# Patient Record
Sex: MALE | Race: WHITE | NOT HISPANIC OR LATINO
[De-identification: names, ages, dates, MRNs, and addresses within clinical notes are randomized per-mention and may not be internally consistent; named-entity substitution may affect disease eponyms.]

---

## 2017-02-04 ENCOUNTER — TRANSCRIPTION ENCOUNTER (OUTPATIENT)
Age: 47
End: 2017-02-04

## 2018-07-26 ENCOUNTER — EMERGENCY (EMERGENCY)
Facility: HOSPITAL | Age: 48
LOS: 1 days | End: 2018-07-26
Payer: COMMERCIAL

## 2018-07-26 PROCEDURE — 99284 EMERGENCY DEPT VISIT MOD MDM: CPT

## 2021-09-13 ENCOUNTER — TRANSCRIPTION ENCOUNTER (OUTPATIENT)
Age: 51
End: 2021-09-13

## 2021-09-13 ENCOUNTER — EMERGENCY (EMERGENCY)
Facility: HOSPITAL | Age: 51
LOS: 0 days | Discharge: HOME | End: 2021-09-14
Attending: EMERGENCY MEDICINE
Payer: COMMERCIAL

## 2021-09-13 VITALS
DIASTOLIC BLOOD PRESSURE: 45 MMHG | SYSTOLIC BLOOD PRESSURE: 69 MMHG | RESPIRATION RATE: 24 BRPM | HEIGHT: 74 IN | HEART RATE: 88 BPM | WEIGHT: 229.94 LBS | TEMPERATURE: 100 F | OXYGEN SATURATION: 96 %

## 2021-09-13 DIAGNOSIS — Z88.0 ALLERGY STATUS TO PENICILLIN: ICD-10-CM

## 2021-09-13 DIAGNOSIS — R53.83 OTHER FATIGUE: ICD-10-CM

## 2021-09-13 DIAGNOSIS — R53.81 OTHER MALAISE: ICD-10-CM

## 2021-09-13 DIAGNOSIS — U07.1 COVID-19: ICD-10-CM

## 2021-09-13 DIAGNOSIS — E78.5 HYPERLIPIDEMIA, UNSPECIFIED: ICD-10-CM

## 2021-09-13 DIAGNOSIS — R51.9 HEADACHE, UNSPECIFIED: ICD-10-CM

## 2021-09-13 DIAGNOSIS — R06.02 SHORTNESS OF BREATH: ICD-10-CM

## 2021-09-13 PROCEDURE — 99284 EMERGENCY DEPT VISIT MOD MDM: CPT

## 2021-09-13 PROCEDURE — 99284 EMERGENCY DEPT VISIT MOD MDM: CPT | Mod: 25

## 2021-09-13 PROCEDURE — 93005 ELECTROCARDIOGRAM TRACING: CPT

## 2021-09-13 PROCEDURE — 96372 THER/PROPH/DIAG INJ SC/IM: CPT

## 2021-09-13 PROCEDURE — 93010 ELECTROCARDIOGRAM REPORT: CPT

## 2021-09-13 RX ORDER — ACETAMINOPHEN 500 MG
1000 TABLET ORAL ONCE
Refills: 0 | Status: COMPLETED | OUTPATIENT
Start: 2021-09-13 | End: 2021-09-13

## 2021-09-13 RX ORDER — ONDANSETRON 8 MG/1
8 TABLET, FILM COATED ORAL ONCE
Refills: 0 | Status: COMPLETED | OUTPATIENT
Start: 2021-09-13 | End: 2021-09-13

## 2021-09-13 RX ORDER — KETOROLAC TROMETHAMINE 30 MG/ML
60 SYRINGE (ML) INJECTION ONCE
Refills: 0 | Status: DISCONTINUED | OUTPATIENT
Start: 2021-09-13 | End: 2021-09-13

## 2021-09-13 RX ADMIN — Medication 1000 MILLIGRAM(S): at 23:53

## 2021-09-13 NOTE — ED PROVIDER NOTE - CARE PLAN
Principal Discharge DX:	2019 novel coronavirus disease (COVID-19)  Secondary Diagnosis:	Malaise and fatigue  Secondary Diagnosis:	Headache   1

## 2021-09-13 NOTE — ED ADULT TRIAGE NOTE - CHIEF COMPLAINT QUOTE
c/o headache, difficulty breathing, loss of taste/smell, chills and sweats, c/o eye soreness, c/o feeling "faint", symptoms started 3 days ago HX: high cholesterol c/o headache, difficulty breathing, loss of taste/smell, chills and sweats, c/o eye soreness, c/o feeling "faint", symptoms started 3 days ago HX: high cholesterol, patient's spouse Margaritamckenna Guydavid #[phone]

## 2021-09-13 NOTE — ED PROVIDER NOTE - MUSCULOSKELETAL, MLM
Spine appears normal, range of motion is not limited, no muscle or joint tenderness normal steady gait

## 2021-09-13 NOTE — ED PROVIDER NOTE - PATIENT PORTAL LINK FT
You can access the FollowMyHealth Patient Portal offered by [facility] by registering at the following website: [email or web address]. By joining [facility]’s FollowMyHealth portal, you will also be able to view your health information using other applications (apps) compatible with our system.

## 2021-09-13 NOTE — ED PROVIDER NOTE - NSFOLLOWUPINSTRUCTIONS_ED_ALL_ED_FT
Plenty of rest  Plenty of fluids  Ibuprofen and tylenol as needed for achiness, headache and/or fever  Someone will call you to schedule monoclonal antibody treatment.  Anything worsens or persists, return to ER for further care and evaluation.

## 2021-09-13 NOTE — ED PROVIDER NOTE - CLINICAL SUMMARY MEDICAL DECISION MAKING FREE TEXT BOX
will treat w/  Toradol, Zofran and sign up for monoclonal antibodies. Pt stable for d/c. will treat w/  Toradol, Zofran and sign up for monoclonal antibodies. VSS and stable for dc at this time. Supportive measures and return precautions discussed.

## 2021-09-14 VITALS
SYSTOLIC BLOOD PRESSURE: 131 MMHG | RESPIRATION RATE: 20 BRPM | HEART RATE: 96 BPM | OXYGEN SATURATION: 97 % | TEMPERATURE: 99 F | DIASTOLIC BLOOD PRESSURE: 69 MMHG

## 2021-09-14 RX ADMIN — ONDANSETRON 8 MILLIGRAM(S): 8 TABLET, FILM COATED ORAL at 00:15

## 2021-09-14 RX ADMIN — Medication 60 MILLIGRAM(S): at 00:14

## 2021-09-14 NOTE — ED ADULT NURSE NOTE - OBJECTIVE STATEMENT
c/o headache, difficulty breathing, loss of taste/smell, chills and sweats, c/o eye soreness, c/o feeling "faint", symptoms started 3 days ago HX: high cholesterol, patient's spouse Margaritamckenna Guydavid #[phone]

## 2021-09-14 NOTE — ED ADULT NURSE NOTE - CHIEF COMPLAINT QUOTE
c/o headache, difficulty breathing, loss of taste/smell, chills and sweats, c/o eye soreness, c/o feeling "faint", symptoms started 3 days ago HX: high cholesterol, patient's spouse Margaritamckenna Guydavid #[phone]

## 2021-09-15 ENCOUNTER — OUTPATIENT (OUTPATIENT)
Dept: OUTPATIENT SERVICES | Facility: HOSPITAL | Age: 51
LOS: 1 days | End: 2021-09-15
Payer: COMMERCIAL

## 2021-09-15 ENCOUNTER — APPOINTMENT (OUTPATIENT)
Dept: DISASTER EMERGENCY | Facility: HOSPITAL | Age: 51
End: 2021-09-15

## 2021-09-15 VITALS
HEART RATE: 97 BPM | RESPIRATION RATE: 18 BRPM | TEMPERATURE: 102 F | DIASTOLIC BLOOD PRESSURE: 77 MMHG | SYSTOLIC BLOOD PRESSURE: 126 MMHG | OXYGEN SATURATION: 94 %

## 2021-09-15 VITALS
WEIGHT: 227.96 LBS | OXYGEN SATURATION: 94 % | DIASTOLIC BLOOD PRESSURE: 79 MMHG | TEMPERATURE: 101 F | RESPIRATION RATE: 18 BRPM | HEIGHT: 78 IN | HEART RATE: 102 BPM | SYSTOLIC BLOOD PRESSURE: 127 MMHG

## 2021-09-15 DIAGNOSIS — U07.1 COVID-19: ICD-10-CM

## 2021-09-15 PROCEDURE — M0243: CPT

## 2021-09-15 RX ORDER — ACETAMINOPHEN 500 MG
650 TABLET ORAL ONCE
Refills: 0 | Status: COMPLETED | OUTPATIENT
Start: 2021-09-15 | End: 2021-09-15

## 2021-09-15 RX ORDER — SODIUM CHLORIDE 9 MG/ML
250 INJECTION INTRAMUSCULAR; INTRAVENOUS; SUBCUTANEOUS
Refills: 0 | Status: COMPLETED | OUTPATIENT
Start: 2021-09-15 | End: 2021-09-15

## 2021-09-15 RX ADMIN — SODIUM CHLORIDE 310 MILLILITER(S): 9 INJECTION INTRAMUSCULAR; INTRAVENOUS; SUBCUTANEOUS at 14:19

## 2021-09-15 RX ADMIN — Medication 650 MILLIGRAM(S): at 15:18

## 2021-09-15 NOTE — CHART NOTE - NSCHARTNOTEFT_GEN_A_CORE
CC: Monoclonal Antibody Infusion/COVID 19 Positive  51yMale with pmhx of HLD presents today for monoclonal antibody infusion. Patient endorses onset of symptoms 9/10 consisting of cough, malaise, HA, SOB, loss of taste/smell, fever and tested + for COVID since 9/7, and referred by  provider for infusion. Patient is unvaccinated.    exam/findings:  T(C): 38.2 (09-15-21 @ 14:29), Max: 38.3 (09-15-21 @ 14:01)  HR: 96 (09-15-21 @ 14:29) (96 - 102)  BP: 112/73 (09-15-21 @ 14:29) (112/73 - 127/79)  RR: 17 (09-15-21 @ 14:29) (17 - 18)  SpO2: 95% (09-15-21 @ 14:29) (94% - 95%)      PE:   Appearance: NAD	  Skin: warm and dry  Neurologic: Non-focal  Extremities: Normal range of motion,    ASSESSMENT:  Pt is a 51yMale with pmhx of HLD, tested + for COVID since 9/7, and referred by  provider to infusion center for Monoclonal antibody infusion (Casirivimab/imdevimab)  Symptoms/ Criteria: Cough, malaise, HA, SOB, loss of taste/smell, fever  Risk Profile includes: BMI >25    PLAN:  - Infusion procedure explained to patient   - Consent for monoclonal antibody infusion obtained   - Risk & benefits discussed/all questions answered  - Tylenol 650mg PO x 1 for temp 100.9 related to COVID symptoms  - Infuse Casirivimab/imdevimab 600mg IV over 21 mins   - Observe patient for one hour post infusion    I have reviewed the Casirivimab/imdevimab Emergency Use Authorization (EUA) and I have provided the patient or patient's caregiver with the following information:      1. FDA has authorized emergency use Casirivimab/imdevimab, which is not an FDA-approved biological product.  2. The patient or patient's caregiver has the option to accept or refuse administration of Casirivimab/imdevimab.   3. The significant known and potential risks and benefits of Casirivimab/imdevimab and the extent to which such risks and benefits are unknown.  4. Information on available alternative treatments and risks and benefits of those alternatives.

## 2021-09-16 ENCOUNTER — TRANSCRIPTION ENCOUNTER (OUTPATIENT)
Age: 51
End: 2021-09-16

## 2021-09-17 ENCOUNTER — TRANSCRIPTION ENCOUNTER (OUTPATIENT)
Age: 51
End: 2021-09-17

## 2021-09-18 ENCOUNTER — TRANSCRIPTION ENCOUNTER (OUTPATIENT)
Age: 51
End: 2021-09-18

## 2021-09-20 ENCOUNTER — TRANSCRIPTION ENCOUNTER (OUTPATIENT)
Age: 51
End: 2021-09-20

## 2021-09-21 ENCOUNTER — TRANSCRIPTION ENCOUNTER (OUTPATIENT)
Age: 51
End: 2021-09-21

## 2023-07-10 PROBLEM — E78.5 HYPERLIPIDEMIA, UNSPECIFIED: Chronic | Status: ACTIVE | Noted: 2021-09-13

## 2023-07-10 PROBLEM — Z00.00 ENCOUNTER FOR PREVENTIVE HEALTH EXAMINATION: Status: ACTIVE | Noted: 2023-07-10

## 2023-07-26 ENCOUNTER — APPOINTMENT (OUTPATIENT)
Dept: PAIN MANAGEMENT | Facility: CLINIC | Age: 53
End: 2023-07-26
Payer: COMMERCIAL

## 2023-07-26 VITALS — BODY MASS INDEX: 31.14 KG/M2 | WEIGHT: 235 LBS | HEIGHT: 73 IN

## 2023-07-26 DIAGNOSIS — G89.29 OTHER CHRONIC PAIN: ICD-10-CM

## 2023-07-26 PROCEDURE — 99214 OFFICE O/P EST MOD 30 MIN: CPT

## 2023-07-26 PROCEDURE — 99212 OFFICE O/P EST SF 10 MIN: CPT

## 2023-07-26 RX ORDER — OXYCODONE AND ACETAMINOPHEN 5; 325 MG/1; MG/1
5-325 TABLET ORAL TWICE DAILY
Qty: 60 | Refills: 0 | Status: ACTIVE | COMMUNITY
Start: 2023-07-26 | End: 1900-01-01

## 2023-07-26 NOTE — HISTORY OF PRESENT ILLNESS
[Lower back] : lower back [6] : 6 [3] : 3 [Dull/Aching] : dull/aching [Radiating] : radiating [Sharp] : sharp [Shooting] : shooting [] : yes [Intermittent] : intermittent [Meds] : meds [Heat] : heat [Exercising] : exercising [FreeTextEntry1] : 07/26/2023: follow up today for med refill\par \par 2/9/21: follow up today. Pt with continued lbp for which he takes opiates PRN. scripts tend to last 6 months. repeat\par utox today. new PA signed.\par Fll  Pi Mt